# Patient Record
Sex: FEMALE | Race: WHITE | NOT HISPANIC OR LATINO | URBAN - METROPOLITAN AREA
[De-identification: names, ages, dates, MRNs, and addresses within clinical notes are randomized per-mention and may not be internally consistent; named-entity substitution may affect disease eponyms.]

---

## 2024-11-21 ENCOUNTER — OFFICE VISIT (OUTPATIENT)
Dept: OTOLARYNGOLOGY | Facility: CLINIC | Age: 34
End: 2024-11-21
Payer: COMMERCIAL

## 2024-11-21 DIAGNOSIS — H60.8X3 CHRONIC ECZEMATOUS OTITIS EXTERNA OF BOTH EARS: Primary | ICD-10-CM

## 2024-11-21 PROCEDURE — 99999 PR PBB SHADOW E&M-NEW PATIENT-LVL II: CPT | Mod: PBBFAC,,, | Performed by: OTOLARYNGOLOGY

## 2024-11-21 RX ORDER — FLUOCINOLONE ACETONIDE 0.25 MG/G
CREAM TOPICAL 2 TIMES DAILY
Qty: 60 G | Refills: 1 | Status: SHIPPED | OUTPATIENT
Start: 2024-11-21

## 2024-11-21 NOTE — PROGRESS NOTES
Ear, Nose, & Throat  Otolaryngology - Head & Neck Surgery    Summary of Visit:  Karoline Lowry is a kind patient who was self-referred for Cerumen Impaction      Subjective:     Chief Complaint:   Chief Complaint   Patient presents with    Cerumen Impaction       Karoline Lowry is a 33 y.o. female who was self-referred for evaluation of her ears.  For many months, she has experienced bilateral aural fullness with associated pruritus, flaky skin, occasional moisture.  She denies any subjective hearing loss, tinnitus, vertigo.  She does not have history of significant allergy nor does she have dermatitis elsewhere in the body.    Past Medical History  Active Ambulatory Problems     Diagnosis Date Noted    No Active Ambulatory Problems     Resolved Ambulatory Problems     Diagnosis Date Noted    No Resolved Ambulatory Problems     No Additional Past Medical History       Past Surgical History  She has no past surgical history on file.    No past surgical history on file.     Family History  Her family history is not on file.    Social History  She     Allergies  She has No Known Allergies.    Medications  She has a current medication list which includes the following prescription(s): fluocinolone.    ROS:  Pertinent positive and negative review of systems as noted in HPI.     Objective:     There were no vitals taken for this visit.   General Appearance:   Awake, Alert and Oriented. NAD. Appropriate affect and appearance      Neuro:   Spontaneous eye opening, appropriate verbal responses, follows commands  Pupils equal, round & brisk. EOMI, no proptosis, no spontaneous nystagmus  Face is symmetric, HB I, non-edematous and SILT bilaterally  Vision grossly intact, Hearing grossly intact  ADI, normal tone     Head and Face:   skin is intact, facial movement symmetric     Ears:  Periauricular regions non-erythematous, non-fluctuanct non-tender  Pinna normal bilaterally, no skin lesions  EACs patent and without  drainage bilaterally; dry flaky skin is noted bilaterally   Tympanic membranes are normal in appearance bilaterally.  No middle ear effusion noted bilaterally.    Nose:   External nose is symmetric, no skin lesions  Septum midline, No inferior turbinate hypertropghy, No polyps or rhinorrhea     OC/OP:  Tongue midline on extension, non-edematous, soft  No labial, buccal, oral tongue or floor of mouth lesions  Soft palate symmetric, midline and without lesions or masses, tonsils symmetric  No masses or lesions of the visualized oropharynx     Neck:  Neck is symmetric, non-edematous, non-erythematous  Trachea is midline and easily palpable,  No palpable adenopathy or masses in levels I-VI     Glands:  Parotid and submandibular glands are symmetric and non-tender.   No thyroid nodules or masses, non-tender      Respiratory:  Normal work of breathing, no accessory muscle use, no stridor     Voice:  Normal vocal quality, volume, prosody and articulation       Assessment:     1. Chronic eczematous otitis externa of both ears        Plan:     I had a  discussion with the patient regarding her condition and the further workup and management options.  No cerumen is noted.  She has mildly atrophic flaky skin consistent with chronic eczematous otitis externa bilaterally.  Fluocinolone cream is prescribed to be used daily for the 1st week until symptoms decline and then weekly for maintenance.  I will be happy to see her in follow up as needed    No orders of the defined types were placed in this encounter.     Medications Ordered This Encounter   Medications    fluocinolone (SYNALAR) 0.025 % cream      Problem List Items Addressed This Visit    None  Visit Diagnoses       Chronic eczematous otitis externa of both ears    -  Primary

## 2025-03-28 NOTE — PROGRESS NOTES
"OBSTETRICS AND GYNECOLOGY    Chief Complaint:  Well Woman Exam, Establish Care     HPI:      Karoline Lowry is a 34 y.o.  who presents today for well woman exam.    LMP: Patient's last menstrual period was 2025. Specifically, patient denies abnormal vaginal bleeding, abnormal discharge/odor, pelvic pain, or dysuria/hematuria. Ms. Lowry is currently sexually active with a single male partner. She is currently using no method for contraception. She declines STD screening today. She denies additional issues, problems, or complaints.     Gardasil: Pt Unsure /3, can request records     OB History          0    Para   0    Term   0       0    AB   0    Living   0         SAB   0    IAB   0    Ectopic   0    Multiple   0    Live Births   0               GYNHx:  Menarche 11  Regular, once monthly. Moderate flow overall. Lasting 4-7 days.   Dysmenorrhea: in beginning  History of STDs: no  History of abnormal pap smears: no  Sexually active: yes, safe in relationship  # of lifetime partners: 10   Preferred pronouns: she/her  Breast/GYN/colon malignancy family history:  no    ROS:     GENERAL: Feeling well overall.   BREASTS: Denies breast skin changes, lumps or nipple discharge.    URINARY: Denies dysuria, hematuria.    Physical Exam:      PHYSICAL EXAM:  /79 (BP Location: Left arm, Patient Position: Sitting)   Ht 5' 8.3" (1.735 m)   Wt 97.3 kg (214 lb 9.9 oz)   LMP 2025   BMI 32.35 kg/m²   Body mass index is 32.35 kg/m².     APPEARANCE:  Well nourished, well developed, in no acute distress.  Able to smile appropriately during our encounter. Makes eye contact. Pleasant.  PSYCH: Appropriate mood and affect.  SKIN:   No acne or hirsutism.  CARDIOVASCULAR:  No edema of peripheral extremities. Well perfused throughout.  RESP:  No accessory muscle use to breathe. Speaking comfortably in complete sentences.   BREASTS:  Symmetrical, with no visible skin lesions or scars, no palpable " masses. No nipple discharge or peau d'orange bilaterally. No palpable axillary LAD.  ABDOMEN:  Soft. Nonacute.    PELVIC:  Normal external genitalia without lesions. Normal hair distribution. Adequate perineal body, normal urethral meatus. Vagina moist and well rugated. Without lesions, without discharge. Cervix 3x4cm, nulliparous. Cervix pink, without ectocervical lesions, discharge or tenderness.  No ectropion. No significant cystocele or rectocele.  Bimanual exam shows uterus to be normal size, regular, mobile and nontender.  Adnexa without masses or tenderness.      Female chaperone present.    Assessment/Plan:     Well Woman Exam  -- Counseled patient regarding healthy diet and regular exercise.   -- BP normotensive  -- She denies abuse and feels safe at home.   -- Pap smear:  NILM 5/2024    -- Fertility with Dr. Le at [facility] compliant  Male factor  Fluoxetine 10mg only PRN anxiety use  -- STD screening:  declines      Follow up in about 1 year (around 3/31/2026) for WWE.    Counseling:     Patient was counseled today on current ASCCP pap guidelines, the recommendation for yearly physical exams, and breast self awareness. She is to see her PCP for other health maintenance.     Use of the [facility] Patient Portal discussed and encouraged during today's visit.           As of April 1, 2021, the Cures Act has been passed nationally. This new law requires that all doctors progress notes, lab results, pathology reports and radiology reports be released IMMEDIATELY to the patient in the patient portal. That means that the results are released to you at the EXACT same time they are released to me. Therefore, with all of the patients that I have I am not able to reply to each patient exactly when the results come in. So there will be a delay from when you see the results to when I see them and have time to come up with a response to send you. Also I only see these results when I am on the computer at work. So if  the results come in over the weekend or after 5 pm of a work day, I will not see them until the next business day. As you can tell, this is a challenge as a physician to give every patient the quick response they hope for and deserve. So please be patient!   Thanks for your understanding and patience.

## 2025-03-31 ENCOUNTER — OFFICE VISIT (OUTPATIENT)
Dept: OBSTETRICS AND GYNECOLOGY | Facility: CLINIC | Age: 35
End: 2025-03-31
Payer: COMMERCIAL

## 2025-03-31 VITALS
BODY MASS INDEX: 32.53 KG/M2 | SYSTOLIC BLOOD PRESSURE: 118 MMHG | DIASTOLIC BLOOD PRESSURE: 79 MMHG | HEIGHT: 68 IN | WEIGHT: 214.63 LBS

## 2025-03-31 DIAGNOSIS — Z01.419 ENCOUNTER FOR WELL WOMAN EXAM: Primary | ICD-10-CM

## 2025-03-31 PROCEDURE — 3008F BODY MASS INDEX DOCD: CPT | Mod: CPTII,S$GLB,, | Performed by: STUDENT IN AN ORGANIZED HEALTH CARE EDUCATION/TRAINING PROGRAM

## 2025-03-31 PROCEDURE — 99999 PR PBB SHADOW E&M-EST. PATIENT-LVL III: CPT | Mod: PBBFAC,,, | Performed by: STUDENT IN AN ORGANIZED HEALTH CARE EDUCATION/TRAINING PROGRAM

## 2025-03-31 PROCEDURE — 1159F MED LIST DOCD IN RCRD: CPT | Mod: CPTII,S$GLB,, | Performed by: STUDENT IN AN ORGANIZED HEALTH CARE EDUCATION/TRAINING PROGRAM

## 2025-03-31 PROCEDURE — 3078F DIAST BP <80 MM HG: CPT | Mod: CPTII,S$GLB,, | Performed by: STUDENT IN AN ORGANIZED HEALTH CARE EDUCATION/TRAINING PROGRAM

## 2025-03-31 PROCEDURE — 3074F SYST BP LT 130 MM HG: CPT | Mod: CPTII,S$GLB,, | Performed by: STUDENT IN AN ORGANIZED HEALTH CARE EDUCATION/TRAINING PROGRAM

## 2025-03-31 PROCEDURE — 99385 PREV VISIT NEW AGE 18-39: CPT | Mod: S$GLB,,, | Performed by: STUDENT IN AN ORGANIZED HEALTH CARE EDUCATION/TRAINING PROGRAM

## 2025-06-26 ENCOUNTER — OFFICE VISIT (OUTPATIENT)
Dept: URGENT CARE | Facility: CLINIC | Age: 35
End: 2025-06-26
Payer: COMMERCIAL

## 2025-06-26 VITALS
TEMPERATURE: 97 F | SYSTOLIC BLOOD PRESSURE: 124 MMHG | OXYGEN SATURATION: 98 % | DIASTOLIC BLOOD PRESSURE: 81 MMHG | HEART RATE: 68 BPM

## 2025-06-26 DIAGNOSIS — Z11.59 SCREENING FOR VIRAL DISEASE: Primary | ICD-10-CM

## 2025-06-26 DIAGNOSIS — H65.93 MIDDLE EAR EFFUSION, BILATERAL: ICD-10-CM

## 2025-06-26 DIAGNOSIS — R42 VESTIBULAR DIZZINESS INVOLVING BOTH INNER EARS: ICD-10-CM

## 2025-06-26 LAB
CTP QC/QA: YES
CTP QC/QA: YES
POC MOLECULAR INFLUENZA A AGN: NEGATIVE
POC MOLECULAR INFLUENZA B AGN: NEGATIVE
SARS-COV+SARS-COV-2 AG RESP QL IA.RAPID: NEGATIVE

## 2025-06-26 RX ORDER — METHYLPREDNISOLONE 4 MG/1
TABLET ORAL
Qty: 21 EACH | Refills: 0 | Status: SHIPPED | OUTPATIENT
Start: 2025-06-26

## 2025-06-26 RX ORDER — ONDANSETRON 4 MG/1
4 TABLET, ORALLY DISINTEGRATING ORAL 3 TIMES DAILY
Qty: 9 TABLET | Refills: 0 | Status: SHIPPED | OUTPATIENT
Start: 2025-06-26 | End: 2025-06-29

## 2025-06-26 RX ORDER — FLUTICASONE PROPIONATE 50 MCG
2 SPRAY, SUSPENSION (ML) NASAL 2 TIMES DAILY
Qty: 16 G | Refills: 0 | Status: SHIPPED | OUTPATIENT
Start: 2025-06-26 | End: 2025-07-26

## 2025-06-26 NOTE — PROGRESS NOTES
Subjective:      Patient ID: Karoline Lowry is a 34 y.o. female.    Vitals:  tympanic temperature is 97.3 °F (36.3 °C). Her blood pressure is 124/81 and her pulse is 68. Her oxygen saturation is 98%.     Chief Complaint: Headache (Woke up with dizziness and nausea. - Entered by patient)    33 yo female states she woke up really dizzy and nauseous and vomited. Pt states she has a headache. Pt states the headache is around her eyes and she can feel the aches in her teeth.  Pt stated symptoms started today.    Headache   This is a new problem. The current episode started today. The problem has been gradually worsening. The pain is located in the Frontal region. The pain radiates to the face. The pain quality is not similar to prior headaches. The quality of the pain is described as aching and pulsating. The pain is at a severity of 7/10. The pain is moderate. Associated symptoms include eye pain, nausea, sinus pressure, a visual change and vomiting. Exacerbated by: movement. She has tried Excedrin (allegra as well) for the symptoms. The treatment provided mild relief.       HENT:  Positive for sinus pressure.    Eyes:  Positive for eye pain.   Gastrointestinal:  Positive for nausea and vomiting.   Neurological:  Positive for headaches.      Objective:     Vitals:    06/26/25 1310   BP: 124/81   BP Location: Right arm   Patient Position: Lying   Pulse: 68   Temp: 97.3 °F (36.3 °C)   TempSrc: Tympanic   SpO2: 98%      Physical Exam   Constitutional: She is oriented to person, place, and time.  Non-toxic appearance. She does not appear ill. No distress.   HENT:   Head: Atraumatic.   Ears:      Comments: Bilateral middle ear effusion  Nose: Congestion present.   Mouth/Throat: No oropharyngeal exudate or posterior oropharyngeal erythema.   Eyes: Conjunctivae are normal.   Neck: Neck supple.   Cardiovascular: Normal rate, regular rhythm, normal heart sounds and normal pulses.   Pulmonary/Chest: Effort normal and breath  sounds normal.   Lymphadenopathy:     She has no cervical adenopathy.   Neurological: no focal deficit. She is alert and oriented to person, place, and time. She displays no weakness. No cranial nerve deficit.      Comments: Negative rhomberg's   Skin: Skin is not diaphoretic.   Psychiatric: Judgment and thought content normal.       Assessment:     1. Screening for viral disease    2. Middle ear effusion, bilateral    3. Vestibular dizziness involving both inner ears        Plan:     33 yo female denies active pregnancy- states she is currently mensurating. She is dizzy with sensation of feeling off balanced without any focal neurological deficit but there is ear fluid level and frontal sinus tenderness on examination. Accompanied by floaters but no other visual disturbance. No hearing loss. No chest pain, SOB, or palpations. No urinary complaints. No abdominal pain. No cough or nasal congestion. No history of migraine or cluster headache. She reports recent travel. No medication changes. I have reviewed labs from 2020 to present; none of which are contributing to current symptoms and I do not see any labs documenting Hemoglobin or iron levels.  Screening for viral disease  -     SARS Coronavirus 2 Antigen, POCT Manual Read  -     POCT Influenza A/B Molecular    2. Middle ear effusion, bilateral  3. Vestibular dizziness involving both inner ears  -     methylPREDNISolone (MEDROL DOSEPACK) 4 mg tablet; use as directed  Dispense: 21 each; Refill: 0  -     ondansetron (ZOFRAN-ODT) 4 MG TbDL; Take 1 tablet (4 mg total) by mouth 3 (three) times daily. for 3 days  Dispense: 9 tablet; Refill: 0  -     fluticasone propionate (FLONASE) 50 mcg/actuation nasal spray; 2 sprays (100 mcg total) by Each Nostril route 2 (two) times a day. for 7 days  Dispense: 16 g; Refill: 0    Patient Instructions   If there is worsening ear pain, fever, speech problems, double vision, loss of vision fields, reading difficulty, weakness,  paralysis, dehydration, loss of consciousness or seizure go to the emergency room.

## 2025-06-26 NOTE — PATIENT INSTRUCTIONS
If there is worsening ear pain, fever, speech problems, double vision, loss of vision fields, reading difficulty, weakness, paralysis, dehydration, loss of consciousness or seizure go to the emergency room.